# Patient Record
Sex: FEMALE | URBAN - METROPOLITAN AREA
[De-identification: names, ages, dates, MRNs, and addresses within clinical notes are randomized per-mention and may not be internally consistent; named-entity substitution may affect disease eponyms.]

---

## 2017-06-25 ASSESSMENT — MIFFLIN-ST. JEOR: SCORE: 1567.25

## 2017-06-26 ENCOUNTER — ANESTHESIA - HEALTHEAST (OUTPATIENT)
Dept: SURGERY | Facility: CLINIC | Age: 32
End: 2017-06-26

## 2017-06-26 ENCOUNTER — SURGERY - HEALTHEAST (OUTPATIENT)
Dept: SURGERY | Facility: CLINIC | Age: 32
End: 2017-06-26

## 2017-06-26 ASSESSMENT — MIFFLIN-ST. JEOR: SCORE: 1591.35

## 2017-06-27 ASSESSMENT — MIFFLIN-ST. JEOR: SCORE: 1616.69

## 2020-04-13 ENCOUNTER — RECORDS - HEALTHEAST (OUTPATIENT)
Dept: LAB | Facility: CLINIC | Age: 35
End: 2020-04-13

## 2020-04-14 LAB
C TRACH DNA SPEC QL PROBE+SIG AMP: NEGATIVE
HPV SOURCE: ABNORMAL
HUMAN PAPILLOMA VIRUS 16 DNA: NEGATIVE
HUMAN PAPILLOMA VIRUS 18 DNA: NEGATIVE
HUMAN PAPILLOMA VIRUS FINAL DIAGNOSIS: ABNORMAL
HUMAN PAPILLOMA VIRUS OTHER HR: POSITIVE
N GONORRHOEA DNA SPEC QL NAA+PROBE: NEGATIVE
SPECIMEN DESCRIPTION: ABNORMAL

## 2020-04-20 ENCOUNTER — RECORDS - HEALTHEAST (OUTPATIENT)
Dept: ADMINISTRATIVE | Facility: OTHER | Age: 35
End: 2020-04-20

## 2021-05-31 VITALS — BODY MASS INDEX: 37.02 KG/M2 | HEIGHT: 63 IN | WEIGHT: 208.9 LBS

## 2021-06-11 NOTE — ANESTHESIA POSTPROCEDURE EVALUATION
Patient: Shahida Shearer  APPENDECTOMY, LAPAROSCOPIC  Anesthesia type: general    Patient location: PACU  Last vitals:   Vitals:    06/26/17 1330   BP: 109/61   Pulse: 84   Resp: (!) 34   Temp: 36.8  C (98.2  F)   SpO2: 96%     Post vital signs: stable  Level of consciousness: awake and responds to simple questions  Post-anesthesia pain: pain controlled  Post-anesthesia nausea and vomiting: no  Pulmonary: unassisted, return to baseline  Cardiovascular: stable and blood pressure at baseline  Hydration: adequate  Anesthetic events: no    QCDR Measures:  ASA# 11 - Taty-op Cardiac Arrest: ASA11B - Patient did NOT experience unanticipated cardiac arrest  ASA# 12 - Taty-op Mortality Rate: ASA12B - Patient did NOT die  ASA# 13 - PACU Re-Intubation Rate: ASA13B - Patient did NOT require a new airway mgmt  ASA# 10 - Composite Anes Safety: ASA10A - No serious adverse event  ASA# 38 - New Corneal Injury: ASA38A - No new exposure keratitis or corneal abrasion in PACU    Additional Notes:

## 2021-06-11 NOTE — ANESTHESIA PREPROCEDURE EVALUATION
Anesthesia Evaluation      Patient summary reviewed   No history of anesthetic complications     Airway   Mallampati: I  Neck ROM: full   Pulmonary - negative ROS and normal exam   (-) recent URI, sleep apnea, not a smoker                         Cardiovascular - negative ROS and normal exam   Neuro/Psych - negative ROS     Endo/Other    (+) obesity,   (-) not pregnant     GI/Hepatic/Renal - negative ROS           Dental - normal exam                        Anesthesia Plan  Planned anesthetic: general endotracheal  Decadron, Zofran, Toradol.  Ketamine (0.5 mg/kg).  Diprivan infusion.    ASA 1   Induction: intravenous   Anesthetic plan and risks discussed with: patient  Anesthesia plan special considerations: antiemetics,   Post-op plan: routine recovery

## 2021-06-11 NOTE — ANESTHESIA CARE TRANSFER NOTE
Last vitals:   Vitals:    06/26/17 1248   BP: 112/64   Pulse: 82   Resp: 24   Temp: 37.2  C (98.9  F)   SpO2: 100%     Patient's level of consciousness is drowsy  Spontaneous respirations: yes  Maintains airway independently: yes  Dentition unchanged: yes  Oropharynx: oropharynx clear of all foreign objects    QCDR Measures:  ASA# 20 - Surgical Safety Checklist: ASA20A - Safety Checks Done  PQRS# 430 - Adult PONV Prevention: 4558F - Pt received => 2 anti-emetic agents (different classes) preop & intraop  ASA# 8 - Peds PONV Prevention: NA - Not pediatric patient, not GA or 2 or more risk factors NOT present  PQRS# 424 - Taty-op Temp Management: 4559F - At least one body temp DOCUMENTED => 35.5C or 95.9F within required timeframe  PQRS# 426 - PACU Transfer Protocol: - Transfer of care checklist used  ASA# 14 - Acute Post-op Pain: ASA14B - Patient did NOT experience pain >= 7 out of 10

## 2021-06-16 PROBLEM — K37 APPENDICITIS: Status: ACTIVE | Noted: 2017-06-26

## 2022-07-06 ENCOUNTER — LAB REQUISITION (OUTPATIENT)
Dept: LAB | Facility: CLINIC | Age: 37
End: 2022-07-06

## 2022-07-06 DIAGNOSIS — Z11.3 ENCOUNTER FOR SCREENING FOR INFECTIONS WITH A PREDOMINANTLY SEXUAL MODE OF TRANSMISSION: ICD-10-CM

## 2022-07-06 DIAGNOSIS — Z12.4 ENCOUNTER FOR SCREENING FOR MALIGNANT NEOPLASM OF CERVIX: ICD-10-CM

## 2022-07-06 PROCEDURE — 87491 CHLMYD TRACH DNA AMP PROBE: CPT | Performed by: PHYSICIAN ASSISTANT

## 2022-07-06 PROCEDURE — G0145 SCR C/V CYTO,THINLAYER,RESCR: HCPCS | Performed by: PHYSICIAN ASSISTANT

## 2022-07-06 PROCEDURE — 87389 HIV-1 AG W/HIV-1&-2 AB AG IA: CPT | Performed by: PHYSICIAN ASSISTANT

## 2022-07-06 PROCEDURE — 86780 TREPONEMA PALLIDUM: CPT | Performed by: PHYSICIAN ASSISTANT

## 2022-07-06 PROCEDURE — 87624 HPV HI-RISK TYP POOLED RSLT: CPT | Performed by: PHYSICIAN ASSISTANT

## 2022-07-07 LAB
C TRACH DNA SPEC QL PROBE+SIG AMP: NEGATIVE
HIV 1+2 AB+HIV1 P24 AG SERPL QL IA: NONREACTIVE
N GONORRHOEA DNA SPEC QL NAA+PROBE: NEGATIVE
T PALLIDUM AB SER QL: NONREACTIVE

## 2022-07-10 LAB
BKR LAB AP GYN ADEQUACY: NORMAL
BKR LAB AP GYN INTERPRETATION: NORMAL
BKR LAB AP HPV REFLEX: NORMAL
BKR LAB AP LMP: NORMAL
BKR LAB AP PREVIOUS ABNORMAL: NORMAL
PATH REPORT.COMMENTS IMP SPEC: NORMAL
PATH REPORT.COMMENTS IMP SPEC: NORMAL
PATH REPORT.RELEVANT HX SPEC: NORMAL

## 2022-07-11 LAB
HUMAN PAPILLOMA VIRUS 16 DNA: NEGATIVE
HUMAN PAPILLOMA VIRUS 18 DNA: NEGATIVE
HUMAN PAPILLOMA VIRUS FINAL DIAGNOSIS: ABNORMAL
HUMAN PAPILLOMA VIRUS OTHER HR: POSITIVE

## 2022-08-30 ENCOUNTER — LAB REQUISITION (OUTPATIENT)
Dept: LAB | Facility: CLINIC | Age: 37
End: 2022-08-30

## 2022-08-30 DIAGNOSIS — R87.619 UNSPECIFIED ABNORMAL CYTOLOGICAL FINDINGS IN SPECIMENS FROM CERVIX UTERI: ICD-10-CM

## 2022-08-30 PROCEDURE — 88305 TISSUE EXAM BY PATHOLOGIST: CPT | Performed by: PATHOLOGY

## 2022-09-01 LAB
PATH REPORT.COMMENTS IMP SPEC: NORMAL
PATH REPORT.COMMENTS IMP SPEC: NORMAL
PATH REPORT.FINAL DX SPEC: NORMAL
PATH REPORT.GROSS SPEC: NORMAL
PATH REPORT.MICROSCOPIC SPEC OTHER STN: NORMAL
PATH REPORT.RELEVANT HX SPEC: NORMAL
PHOTO IMAGE: NORMAL